# Patient Record
(demographics unavailable — no encounter records)

---

## 2024-12-09 NOTE — HISTORY OF PRESENT ILLNESS
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 On 2022, she underwent a robotic TLH/BSO/staging (specimen removal via supraumbilical minilap). Final pathology revealed stage IIIC1 UPSC, MSI-S, HER2 blake (+). There was 99% myometrial invasion, and extensive LVI, with 1/1 right and 1/2 left pelvic sentinel lymph node positive for macrometastases without perinodal tumor extension.  Imagin22 preop CT A/P ([facility]) - No evidence of abdominal metastatic disease.  Carboplatin and Taxol x six cycles. (23- 23) Patient declined Herceptin and maintenance herceptin. She underwent radiation therapy to the pelvis at a dose of 5040 Gy. The patient tolerated treatment well without developing any grade 3 or higher acute toxicities due to her treatment. 2023 SCANS negative for any disease.  Markers: 23:  = 10 5/3/23:  = 10, CEA = 2.1  Imagin22 preop CT A/P ([facility]) - No evidence of abdominal metastatic disease. 23: CT C/A/P: THEO [de-identified] : Carboplatin and Taxol x six cycles. (1/9/23- 4/24/23). Declined Trastuzumab. EBRT- 5040 Gy [de-identified] : Patient is here for routine follow up. She is feeling well, has no complaints. She would like to lose weight, is going to try and exercise more. Denies abdominal pain, bloating, nausea, vomiting, bowel/bladder issues, bleeding, swelling.

## 2025-01-16 NOTE — LETTER BODY
[Dear  ___] : Dear  [unfilled], [I recently saw our patient [unfilled] for a follow-up visit.] : I recently saw our patient, [unfilled] for a follow-up visit. [Attached please find my note.] : Attached please find my note. [FreeTextEntry2] : She is clinically without evidence of disease and she is doing well. She will be seeing me regularly for surveillance exams and I will keep you updated on her progress. Thank you again for referring this penny patient.

## 2025-01-16 NOTE — HISTORY OF PRESENT ILLNESS
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 On 2022, she underwent a robotic TLH/BSO/staging (specimen removal via supraumbilical minilap).  Final pathology revealed stage IIIC1 UPSC, MSI-S, HER2 blake (+).   There was 99% myometrial invasion, and extensive LVI, with 1/1 right and 1/2 left pelvic sentinel lymph node positive for macrometastases without perinodal tumor extension.  Carboplatin and Taxol x six cycles. (23- 23) Patient declined Herceptin and maintenance herceptin. She underwent radiation therapy to the pelvis at a dose of 5040 Gy. The patient tolerated treatment well without developing any grade 3 or higher acute toxicities due to her treatment.  Markers: 23:  = 10 5/3/23:  = 10, CEA = 2.1 23: = 10, CEA= 1.7 : = 10, CEA= 1.7 3/2024: = 8 2024  = 7 2024  = 7  Imagin22 preop CT A/P ([facility]) - No evidence of abdominal metastatic disease. 23: CT C/A/P: THEO 23: CT CAP: No evidence of recurrent or metastatic disease in the chest, abdomen, or pelvis. Nonspecific wall thickening of the sigmoid colon. Correlate for colitis. CT scheduled for 23. 2024: CT CAP: Focal sigmoid wall thickening similar to 2023, suggest correlation for post radiation changes. No metastatic or recurrent disease identified. 2024: No gross evidence for recurrent or metastatic disease. Mild wall thickening in the sigmoid colon which was present previously. Mild colitis from infectious, inflammatory or post radiation changes are in the differential diagnosis 10/2024 CT C/A/P: Stable CT scan of the chest, abdomen, and pelvis. No evidence for recurrent or metastatic disease. Wall thickening of the sigmoid colon is again appreciated without associated pericolonic fat stranding. Clinical correlation is recommended as colitis is not excluded. Cholelithiasis.  She uses her dilator and has some sexual activity with her .  She denies abdominal/pelvic pain, dyspnea or chest pain, vaginal bleeding or discharge, nausea/vomiting, changes in bowel habits or urination, lower extremity edema or pain or any other associated signs or symptoms.   Health Maintenance: COVID vaccine received: plus boosters Mammogram: 2024 BIRADS 2 ([facility]); aware due Colonoscopy: 5 years ago; follows with GI; has been advised not necessary currently PAP: 2022- negative, HPV neg; now n/a  Referring: Dr. Tobias robles in Feb PCP: Dr. Winslow GI: Dr. Galicia Med Onc: Dr. Lyle Alegre Onc: Dr. Subramanian

## 2025-01-16 NOTE — PHYSICAL EXAM
[Chaperone Present] : A chaperone was present in the examining room during all aspects of the physical examination [83062] : A chaperone was present during the pelvic exam. [FreeTextEntry2] : Luiza [Absent] : Adnexa(ae): Absent [Normal] : Recto-Vaginal Exam: Normal [de-identified] : well-healed [de-identified] : cuff well-healed, no lesions [de-identified] : cuff mobile, no mass nor tenderness

## 2025-01-16 NOTE — ASSESSMENT
[FreeTextEntry1] : 79 y/o with stage IIIC1 UPSC, currently nearing the end of adjuvant chemotherapy, clinically without evidence of disease.

## 2025-01-16 NOTE — DISCUSSION/SUMMARY
[Reviewed Clinical Lab Test(s)] : Results of clinical tests were reviewed. [Reviewed Radiology Report(s)] : Radiology reports were reviewed. [FreeTextEntry1] : - The risk of recurrence, signs and symptoms and surveillance plan were reviewed in detail.  - HM reviewed. - She was advised to see me in 6 months, alternating q3m with Dr. Goodwin.  - All questions were answered to her apparent satisfaction.

## 2025-04-02 NOTE — PHYSICAL EXAM
[] : no respiratory distress [Heart Rate And Rhythm] : heart rate and rhythm were normal [Abdomen Soft] : soft [Abdomen Tenderness] : non-tender [Normal] : normal spine exam without palpable tenderness, no kyphosis or scoliosis [Musculoskeletal - Swelling] : no joint swelling [Skin Color & Pigmentation] : normal skin color and pigmentation [No Focal Deficits] : no focal deficits [Oriented To Time, Place, And Person] : oriented to person, place, and time [Normal External Genitalia] : normal external genitalia  [Normal Uterus] : normal uterus [FreeTextEntry1] : deferred

## 2025-04-02 NOTE — HISTORY OF PRESENT ILLNESS
[FreeTextEntry1] : PHILOMENA BEDOYA is an 80-year-old woman diagnosed with FIGO stage IIIC1, pT1b pN1a, serous carcinoma of the endometrium s/p WALLY, BSO, SLNB. Pathology revealed serous carcinoma with 99% myometrial invasion, and extensive LVI, with 1/1 right and 1/2 left pelvic sentinel lymph node positive for macrometastases without perinodal tumor extension.  She completed carboplatin and Taxol x six cycles (1/9/23- 4/24/23.) Patient declined Herceptin and maintenance herceptin. She underwent radiation therapy to the pelvis at a dose of 5040 cGy completed 6/28/23. The patient tolerated treatment well without developing any grade 3 or higher acute toxicities due to her treatment.  8/11/23: The patient presents today for post-treatment evaluation. Feeling well. Reports good appetite. Occasional abdominal cramping which subsides after moving bowels. Denies and urinary complaints. Moving bowels normally. Denies any vaginal bleeding or discharge. Had follow up with gyn onc yesterday and med onc today. Discussed use of vaginal dilators and provided size 4 and 5.  1/16/24 CT C/A/P: Focal sigmoid wall thickening similar to 08/14/2023, suggest correlation for post radiation changes.  No metastatic or recurrent disease identified.   2/22/24: Presents today for routine follow-up. Continues following with Sara Lanza and Lyle. Recent CT stable. She is generally feeling well, no abdominal or pelvic pain, no vaginal bleeding or discharge, no urinary complaints, no diarrhea or constipation, although she is feeling bloated.   6/28/24 CT C/A/P: No gross evidence for recurrent or metastatic disease. Mild wall thickening in the sigmoid colon which was present previously. Mild colitis from infectious, inflammatory or post radiation changes are in the differential diagnosis. Please correlate clinically.  9/19/24: Presents today for routine follow-up. Continues following with Sara Alvarenga and Pool. Recent CEA,  WNL. Feeling well, no abdominal or pelvic pain, no dysuria, some incontinence. No vaginal bleeding or discharge. Using vaginal dilators.   10/11/24 CT C/A/P: Stable CT scan of the chest, abdomen, and pelvis. No evidence for recurrent or metastatic disease. Wall thickening of the sigmoid colon is again appreciated without associated pericolonic fat stranding. Clinical correlation is recommended as colitis is not excluded. Cholelithiasis.  12/2024 She followed up with GI Dr Galicia for CT finding. Colonoscopy not recommended  3/31/25: Presents for follow-up. Continues following with Sara Alvarenga and Pool.

## 2025-06-10 NOTE — HISTORY OF PRESENT ILLNESS
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 On 2022, she underwent a robotic TLH/BSO/staging (specimen removal via supraumbilical minilap). Final pathology revealed stage IIIC1 UPSC, MSI-S, HER2 blake (+). There was 99% myometrial invasion, and extensive LVI, with 1/1 right and 1/2 left pelvic sentinel lymph node positive for macrometastases without perinodal tumor extension.  Imagin22 preop CT A/P ([facility]) - No evidence of abdominal metastatic disease.  Carboplatin and Taxol x six cycles. (23- 23) Patient declined Herceptin and maintenance herceptin. She underwent radiation therapy to the pelvis at a dose of 5040 Gy. The patient tolerated treatment well without developing any grade 3 or higher acute toxicities due to her treatment. 2023 SCANS negative for any disease.  Markers: 23:  = 10 5/3/23:  = 10, CEA = 2.1  Imagin22 preop CT A/P ([facility]) - No evidence of abdominal metastatic disease. 23: CT C/A/P: THEO [de-identified] : Carboplatin and Taxol x six cycles. (1/9/23- 4/24/23). Declined Trastuzumab. EBRT- 5040 Gy [de-identified] : Patient is here for a routine follow-up. She reports lower abdominal pain and mild urinary symptoms, with UA concerning for UTI, but she refused antibiotics. She also noticed a small whitish discharge, which she thinks might be a fungal infection. She talked to her gynecologist and was recommended to go to urgent care. She does not have fevers, nausea, or vomiting no dysuria. She would like to lose weight and is planning to exercise more. She denies abdominal pain, bloating, nausea, vomiting, bowel/bladder issues, bleeding, and swelling.

## 2025-06-10 NOTE — HISTORY OF PRESENT ILLNESS
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 On 2022, she underwent a robotic TLH/BSO/staging (specimen removal via supraumbilical minilap). Final pathology revealed stage IIIC1 UPSC, MSI-S, HER2 blake (+). There was 99% myometrial invasion, and extensive LVI, with 1/1 right and 1/2 left pelvic sentinel lymph node positive for macrometastases without perinodal tumor extension.  Imagin22 preop CT A/P ([facility]) - No evidence of abdominal metastatic disease.  Carboplatin and Taxol x six cycles. (23- 23) Patient declined Herceptin and maintenance herceptin. She underwent radiation therapy to the pelvis at a dose of 5040 Gy. The patient tolerated treatment well without developing any grade 3 or higher acute toxicities due to her treatment. 2023 SCANS negative for any disease.  Markers: 23:  = 10 5/3/23:  = 10, CEA = 2.1  Imagin22 preop CT A/P ([facility]) - No evidence of abdominal metastatic disease. 23: CT C/A/P: THEO [de-identified] : Carboplatin and Taxol x six cycles. (1/9/23- 4/24/23). Declined Trastuzumab. EBRT- 5040 Gy [de-identified] : Patient is here for a routine follow-up. She reports lower abdominal pain and mild urinary symptoms, with UA concerning for UTI, but she refused antibiotics. She also noticed a small whitish discharge, which she thinks might be a fungal infection. She talked to her gynecologist and was recommended to go to urgent care. She does not have fevers, nausea, or vomiting no dysuria. She would like to lose weight and is planning to exercise more. She denies abdominal pain, bloating, nausea, vomiting, bowel/bladder issues, bleeding, and swelling.

## 2025-07-24 NOTE — ASSESSMENT
[FreeTextEntry1] : 81 y/o with stage IIIC1 UPSC, currently nearing the end of adjuvant chemotherapy, clinically without evidence of disease.

## 2025-07-24 NOTE — PHYSICAL EXAM
[MA] : MA [FreeTextEntry2] : Bernice [Absent] : Adnexa(ae): Absent [Normal] : Recto-Vaginal Exam: Normal [de-identified] : well-healed [de-identified] : cuff well-healed, no lesions [de-identified] : cuff mobile, no mass nor tenderness

## 2025-07-24 NOTE — HISTORY OF PRESENT ILLNESS
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 On 2022, she underwent a robotic TLH/BSO/staging (specimen removal via supraumbilical minilap).  Final pathology revealed stage IIIC1 UPSC, MSI-S, HER2 blake (+).   There was 99% myometrial invasion, and extensive LVI, with 1/1 right and 1/2 left pelvic sentinel lymph node positive for macrometastases without perinodal tumor extension.  Carboplatin and Taxol x six cycles. (23- 23) Patient declined Herceptin and maintenance herceptin. She underwent radiation therapy to the pelvis at a dose of 5040 Gy. The patient tolerated treatment well without developing any grade 3 or higher acute toxicities due to her treatment.  Markers: 23:  = 10 5/3/23:  = 10, CEA = 2.1 23: = 10, CEA= 1.7 : = 10, CEA= 1.7 3/2024: = 8 2024  = 7 2024  = 7 2025  = 8  Imagin22 preop CT A/P ([facility]) - No evidence of abdominal metastatic disease. 23: CT C/A/P: THEO 23: CT CAP: No evidence of recurrent or metastatic disease in the chest, abdomen, or pelvis. Nonspecific wall thickening of the sigmoid colon. Correlate for colitis. CT scheduled for 23. 2024: CT CAP: Focal sigmoid wall thickening similar to 2023, suggest correlation for post radiation changes. No metastatic or recurrent disease identified. 2024: No gross evidence for recurrent or metastatic disease. Mild wall thickening in the sigmoid colon which was present previously. Mild colitis from infectious, inflammatory or post radiation changes are in the differential diagnosis 10/2024 CT C/A/P: Stable CT scan of the chest, abdomen, and pelvis. No evidence for recurrent or metastatic disease. Wall thickening of the sigmoid colon is again appreciated without associated pericolonic fat stranding. Clinical correlation is recommended as colitis is not excluded. Cholelithiasis. 25: CT C/A/P: THEO, right renal cyst  She uses her dilator and has some sexual activity with her .  She denies abdominal/pelvic pain, dyspnea or chest pain, vaginal bleeding or discharge, nausea/vomiting, changes in bowel habits or urination, lower extremity edema or pain or any other associated signs or symptoms.   Health Maintenance: Mammogram: 2025 BIRADS 2 ([facility]) Colonoscopy: 5 years ago; follows with GI; has been advised not necessary currently PAP: 2022- negative, HPV neg; now n/a  Referring: Dr. Tobias robles in Feb PCP: Dr. Winslow GI: Dr. Galicia Med Onc: Dr. Lyle Alegre Onc: Dr. Subramanian